# Patient Record
Sex: MALE | Race: OTHER | Employment: FULL TIME | URBAN - METROPOLITAN AREA
[De-identification: names, ages, dates, MRNs, and addresses within clinical notes are randomized per-mention and may not be internally consistent; named-entity substitution may affect disease eponyms.]

---

## 2019-09-20 ENCOUNTER — HOSPITAL ENCOUNTER (EMERGENCY)
Age: 24
Discharge: HOME | End: 2019-09-20

## 2019-09-20 ENCOUNTER — APPOINTMENT (OUTPATIENT)
Dept: GENERAL RADIOLOGY | Age: 24
End: 2019-09-20

## 2019-09-20 VITALS
HEART RATE: 81 BPM | OXYGEN SATURATION: 100 % | DIASTOLIC BLOOD PRESSURE: 80 MMHG | TEMPERATURE: 98 F | BODY MASS INDEX: 21.97 KG/M2 | WEIGHT: 140 LBS | SYSTOLIC BLOOD PRESSURE: 110 MMHG | HEIGHT: 67 IN | RESPIRATION RATE: 16 BRPM

## 2019-09-20 DIAGNOSIS — L03.011 PARONYCHIA OF THUMB, RIGHT: Primary | ICD-10-CM

## 2019-09-20 PROCEDURE — 6370000000 HC RX 637 (ALT 250 FOR IP): Performed by: PHYSICIAN ASSISTANT

## 2019-09-20 PROCEDURE — 4500000022 HC ED LEVEL 2 PROCEDURE

## 2019-09-20 PROCEDURE — 99282 EMERGENCY DEPT VISIT SF MDM: CPT

## 2019-09-20 RX ORDER — CEPHALEXIN 500 MG/1
500 CAPSULE ORAL 4 TIMES DAILY
Qty: 40 CAPSULE | Refills: 0 | Status: SHIPPED | OUTPATIENT
Start: 2019-09-20 | End: 2019-09-30

## 2019-09-20 RX ORDER — IBUPROFEN 800 MG/1
800 TABLET ORAL ONCE
Status: COMPLETED | OUTPATIENT
Start: 2019-09-20 | End: 2019-09-20

## 2019-09-20 RX ORDER — HYDROCODONE BITARTRATE AND ACETAMINOPHEN 5; 325 MG/1; MG/1
1 TABLET ORAL EVERY 8 HOURS PRN
Qty: 6 TABLET | Refills: 0 | Status: SHIPPED | OUTPATIENT
Start: 2019-09-20 | End: 2019-09-23

## 2019-09-20 RX ADMIN — IBUPROFEN 800 MG: 800 TABLET, FILM COATED ORAL at 12:23

## 2019-09-20 ASSESSMENT — PAIN SCALES - GENERAL: PAINLEVEL_OUTOF10: 5

## 2019-09-20 ASSESSMENT — PAIN SCALES - WONG BAKER: WONGBAKER_NUMERICALRESPONSE: 6

## 2019-09-20 ASSESSMENT — PAIN DESCRIPTION - LOCATION: LOCATION: FINGER (COMMENT WHICH ONE)

## 2019-09-20 ASSESSMENT — PAIN DESCRIPTION - PAIN TYPE
TYPE: ACUTE PAIN
TYPE: ACUTE PAIN

## 2019-09-20 ASSESSMENT — PAIN DESCRIPTION - ORIENTATION: ORIENTATION: LEFT

## 2019-09-20 NOTE — ED PROVIDER NOTES
[facility] Emergency Department    CHIEF COMPLAINT  Finger Pain (right thumb swelling after pulling a piece of skin by nail )      HISTORY OF PRESENT ILLNESS  Marcos A. Don Phalen is a 27 y.o. male who presents to the ED complaining of of thumb pain and swelling. Observed lying in bed, appears nontoxic and in no acute distress at this time. Drove himself in today for evaluation. Patient primarily speaks [place] although was able to communicate adequately. The patient states that four days ago he developed swelling to his right thumb around the nail with associated pain. States that this was after he removed a piece of skin near the nail. Rates his pain a 6/10 in severity. Worse w appears consistent with paronychia.  ith touch and movement. Does not radiate. No known injury. Has not taken anything at home to alleviate his pain. Denies any fevers or chills. Patient is right handed. Believes he is up-to-date on tetanus vaccination. No fevers or chills. No other complaints, modifying factors or associated symptoms. Nursing notes reviewed. History reviewed. No pertinent past medical history. History reviewed. No pertinent surgical history. History reviewed. No pertinent family history.   Social History     Socioeconomic History    Marital status:      Spouse name: Not on file    Number of children: Not on file    Years of education: Not on file    Highest education level: Not on file   Occupational History    Not on file   Social Needs    Financial resource strain: Not on file    Food insecurity:     Worry: Not on file     Inability: Not on file    Transportation needs:     Medical: Not on file     Non-medical: Not on file   Tobacco Use    Smoking status: Never Smoker   Substance and Sexual Activity    Alcohol use: Not Currently    Drug use: Never    Sexual activity: Not on file   Lifestyle    Physical activity:     Days per week: Not on file their surrogate had an opportunity to ask questions, and the risks, benefits, and alternatives were discussed. Patient verbally consented to the procedure. I mixed 1.5 mL of 2% lidocaine without epi and 1.5 mL of 0.5% bupivacaine. Using pt's proximal phalanx of the right thumb as landmark, I injected 1.0 mL of the mixture on each side of the digit (total 1.0 mL)  ESBL minimal. Complication none. Procedure Note: Incision and Drainage  Indication: Abscess, needs drainage  The risk and benefit of I&D was explained to the patient. Sunny Rodriguez or their surrogate had an opportunity to ask questions, and the risks, benefits, and alternatives were discussed. Patient gave verbal consent. The digit was anesthetized via digital block. The patient then had a 1.0cm stab incision over the area of maximum fluctuance with approximately 2-3 mL of purulent material expressed. The abscess was explored using a hemostat and locations were broken up. The wound was pack left unpacked. The patient tolerated the procedure well. Abscess was dressed with sterile bandage. ED COURSE   I have independently evaluated this patient. Patient received ibuprofen along with digital block for pain, with good relief. Paronychia to right thumb was numbed and drained without complication. Sterile bandage was applied. Patient will be discharged on oral antibiotics with short course of analgesics with PCP referral for further evaluation more definitive care and wound check. Discussed return precautions as well as recommendations for follow-up and patient in agreement and comfortable at discharge. A discussion was had with the patient regarding finger infection, ED findings, applying warm compresses to the area, as well as recommendations for follow-up. All questions were answered. Patient will establish care and follow up with PCP within 2 to 3 days for wound check and for further evaluation/treatment in two days.  Patient will return to ED for new/worsening symptoms. Patient was sent home with a prescription for cephalexin and norco.    MDM  I estimate there is LOW risk for COMPARTMENT SYNDROME, DEEP VENOUS THROMBOSIS, SEPTIC ARTHRITIS, TENDON OR NEUROVASCULAR INJURY, thus I consider the discharge disposition reasonable. Sunny Wooten and I have discussed the diagnosis and risks, and we agree with discharging home to follow-up with their primary doctor or the referral orthopedist. We also discussed returning to the Emergency Department immediately if new or worsening symptoms occur. We have discussed the symptoms which are most concerning (e.g., changing or worsening pain, numbness, weakness) that necessitate immediate return. Final Impression  1. Paronychia of thumb, right      Blood pressure 110/80, pulse 81, temperature 98 °F (36.7 °C), temperature source Oral, resp. rate 16, height 5' 7\" (1.702 m), weight 140 lb (63.5 kg), SpO2 100 %. DISPOSITION  Patient was discharged to home in good condition.          Malu Aguayo  09/20/19 2050